# Patient Record
Sex: MALE | URBAN - METROPOLITAN AREA
[De-identification: names, ages, dates, MRNs, and addresses within clinical notes are randomized per-mention and may not be internally consistent; named-entity substitution may affect disease eponyms.]

---

## 2017-08-10 ENCOUNTER — HOSPITAL ENCOUNTER (OUTPATIENT)
Facility: CLINIC | Age: 57
End: 2017-08-10
Attending: SURGERY | Admitting: SURGERY
Payer: COMMERCIAL

## 2017-08-15 ENCOUNTER — CARE COORDINATION (OUTPATIENT)
Dept: SURGERY | Facility: CLINIC | Age: 57
End: 2017-08-15

## 2017-08-15 NOTE — PROGRESS NOTES
Received a call from Jeannette from [employer]. She states she spoke to patient regarding his upcoming biopsy with Dr. Ching on 8/23. Patient has been approved to have the procedure done at an in network facility. This facility is not in network and will cost approximately $13,500 to have done. She states after patient heard this he wanted to cancel the procedure and have it done at a different location. She instructed patient that he needed to call to cancel the procedure, but she was unsure if he would follow up, so she called to relay this information. She states patient should be calling to confirm the cancellation. Regardless of if he calls or not, she states patient will not be coming for the scheduled procedure.